# Patient Record
Sex: MALE | Race: WHITE | NOT HISPANIC OR LATINO | Employment: FULL TIME | URBAN - METROPOLITAN AREA
[De-identification: names, ages, dates, MRNs, and addresses within clinical notes are randomized per-mention and may not be internally consistent; named-entity substitution may affect disease eponyms.]

---

## 2022-07-15 PROCEDURE — U0004 COV-19 TEST NON-CDC HGH THRU: HCPCS | Performed by: PERSONAL EMERGENCY RESPONSE ATTENDANT

## 2022-07-17 ENCOUNTER — TELEPHONE (OUTPATIENT)
Dept: URGENT CARE | Facility: CLINIC | Age: 22
End: 2022-07-17

## 2022-07-17 NOTE — TELEPHONE ENCOUNTER
----- Message from HEBER Sims sent at 7/17/2022  8:34 AM EDT -----  Positive.  Please notify patient and reviewed quarantine protocol.

## 2022-07-17 NOTE — TELEPHONE ENCOUNTER
Notify patient of positive COVID-19 test results.  Reviewed quarantine protocol per [email or web address].